# Patient Record
Sex: FEMALE | Race: WHITE | NOT HISPANIC OR LATINO | Employment: FULL TIME | URBAN - METROPOLITAN AREA
[De-identification: names, ages, dates, MRNs, and addresses within clinical notes are randomized per-mention and may not be internally consistent; named-entity substitution may affect disease eponyms.]

---

## 2023-09-11 LAB
ALANINE AMINOTRANSFERASE (SGPT) (U/L) IN SER/PLAS: 13 U/L (ref 7–45)
ALBUMIN (G/DL) IN SER/PLAS: 4.6 G/DL (ref 3.4–5)
ALKALINE PHOSPHATASE (U/L) IN SER/PLAS: 40 U/L (ref 33–110)
ANION GAP IN SER/PLAS: 11 MMOL/L (ref 10–20)
APPEARANCE, URINE: CLEAR
ASPARTATE AMINOTRANSFERASE (SGOT) (U/L) IN SER/PLAS: 12 U/L (ref 9–39)
BASOPHILS (10*3/UL) IN BLOOD BY AUTOMATED COUNT: 0.05 X10E9/L (ref 0–0.1)
BASOPHILS/100 LEUKOCYTES IN BLOOD BY AUTOMATED COUNT: 1 % (ref 0–2)
BILIRUBIN TOTAL (MG/DL) IN SER/PLAS: 0.9 MG/DL (ref 0–1.2)
BILIRUBIN, URINE: NEGATIVE
BLOOD, URINE: NEGATIVE
CALCIDIOL (25 OH VITAMIN D3) (NG/ML) IN SER/PLAS: 36 NG/ML
CALCIUM (MG/DL) IN SER/PLAS: 9.6 MG/DL (ref 8.6–10.6)
CARBON DIOXIDE, TOTAL (MMOL/L) IN SER/PLAS: 29 MMOL/L (ref 21–32)
CHLORIDE (MMOL/L) IN SER/PLAS: 107 MMOL/L (ref 98–107)
COLOR, URINE: YELLOW
COMPLEMENT C3 (MG/DL) IN SER/PLAS: 115 MG/DL (ref 87–200)
COMPLEMENT C4 (MG/DL) IN SER/PLAS: 25 MG/DL (ref 10–50)
CREATININE (MG/DL) IN SER/PLAS: 0.68 MG/DL (ref 0.5–1.05)
CREATININE (MG/DL) IN URINE: 62 MG/DL (ref 20–320)
EOSINOPHILS (10*3/UL) IN BLOOD BY AUTOMATED COUNT: 0.13 X10E9/L (ref 0–0.7)
EOSINOPHILS/100 LEUKOCYTES IN BLOOD BY AUTOMATED COUNT: 2.6 % (ref 0–6)
ERYTHROCYTE DISTRIBUTION WIDTH (RATIO) BY AUTOMATED COUNT: 12.2 % (ref 11.5–14.5)
ERYTHROCYTE MEAN CORPUSCULAR HEMOGLOBIN CONCENTRATION (G/DL) BY AUTOMATED: 33.7 G/DL (ref 32–36)
ERYTHROCYTE MEAN CORPUSCULAR VOLUME (FL) BY AUTOMATED COUNT: 95 FL (ref 80–100)
ERYTHROCYTES (10*6/UL) IN BLOOD BY AUTOMATED COUNT: 4.39 X10E12/L (ref 4–5.2)
GFR FEMALE: >90 ML/MIN/1.73M2
GLUCOSE (MG/DL) IN SER/PLAS: 77 MG/DL (ref 74–99)
GLUCOSE, URINE: NEGATIVE MG/DL
HEMATOCRIT (%) IN BLOOD BY AUTOMATED COUNT: 41.9 % (ref 36–46)
HEMOGLOBIN (G/DL) IN BLOOD: 14.1 G/DL (ref 12–16)
IMMATURE GRANULOCYTES/100 LEUKOCYTES IN BLOOD BY AUTOMATED COUNT: 0.4 % (ref 0–0.9)
KETONES, URINE: NEGATIVE MG/DL
LEUKOCYTE ESTERASE, URINE: NEGATIVE
LEUKOCYTES (10*3/UL) IN BLOOD BY AUTOMATED COUNT: 5 X10E9/L (ref 4.4–11.3)
LYMPHOCYTES (10*3/UL) IN BLOOD BY AUTOMATED COUNT: 2.11 X10E9/L (ref 1.2–4.8)
LYMPHOCYTES/100 LEUKOCYTES IN BLOOD BY AUTOMATED COUNT: 42.3 % (ref 13–44)
MONOCYTES (10*3/UL) IN BLOOD BY AUTOMATED COUNT: 0.25 X10E9/L (ref 0.1–1)
MONOCYTES/100 LEUKOCYTES IN BLOOD BY AUTOMATED COUNT: 5 % (ref 2–10)
NEUTROPHILS (10*3/UL) IN BLOOD BY AUTOMATED COUNT: 2.43 X10E9/L (ref 1.2–7.7)
NEUTROPHILS/100 LEUKOCYTES IN BLOOD BY AUTOMATED COUNT: 48.7 % (ref 40–80)
NITRITE, URINE: NEGATIVE
NRBC (PER 100 WBCS) BY AUTOMATED COUNT: 0 /100 WBC (ref 0–0)
PH, URINE: 6 (ref 5–8)
PLATELETS (10*3/UL) IN BLOOD AUTOMATED COUNT: 215 X10E9/L (ref 150–450)
POTASSIUM (MMOL/L) IN SER/PLAS: 4.6 MMOL/L (ref 3.5–5.3)
PROTEIN (MG/DL) IN URINE: 6 MG/DL (ref 5–24)
PROTEIN TOTAL: 7.5 G/DL (ref 6.4–8.2)
PROTEIN, URINE: NEGATIVE MG/DL
PROTEIN/CREATININE (MG/MG) IN URINE: 0.1 MG/MG CREAT (ref 0–0.17)
SEDIMENTATION RATE, ERYTHROCYTE: 1 MM/H (ref 0–20)
SODIUM (MMOL/L) IN SER/PLAS: 142 MMOL/L (ref 136–145)
SPECIFIC GRAVITY, URINE: 1.01 (ref 1–1.03)
UREA NITROGEN (MG/DL) IN SER/PLAS: 10 MG/DL (ref 6–23)
UROBILINOGEN, URINE: <2 MG/DL (ref 0–1.9)

## 2023-09-27 PROBLEM — E03.9 HYPOTHYROIDISM: Status: ACTIVE | Noted: 2023-09-27

## 2023-09-27 PROBLEM — R53.83 FATIGUE: Status: ACTIVE | Noted: 2023-09-27

## 2023-09-27 PROBLEM — Z97.5 IMPLANON IN PLACE: Status: ACTIVE | Noted: 2023-09-27

## 2023-09-27 PROBLEM — E06.3 HASHIMOTO'S THYROIDITIS: Status: ACTIVE | Noted: 2023-09-27

## 2023-09-27 PROBLEM — H04.129 DRY EYE SYNDROME: Status: ACTIVE | Noted: 2023-09-27

## 2023-09-27 PROBLEM — I87.1 MAY-THURNER SYNDROME: Status: ACTIVE | Noted: 2023-09-27

## 2023-09-27 PROBLEM — Z01.00 EMMETROPIA: Status: ACTIVE | Noted: 2023-09-27

## 2023-09-27 PROBLEM — R31.9 HEMATURIA: Status: ACTIVE | Noted: 2023-09-27

## 2023-09-27 PROBLEM — M06.4 INFLAMMATORY POLYARTHROPATHY (MULTI): Status: ACTIVE | Noted: 2023-09-27

## 2023-09-27 PROBLEM — Z86.39 H/O HASHIMOTO THYROIDITIS: Status: ACTIVE | Noted: 2023-09-27

## 2023-09-27 PROBLEM — D68.51 FACTOR V LEIDEN MUTATION (MULTI): Status: ACTIVE | Noted: 2023-09-27

## 2023-09-27 PROBLEM — R25.2 TRISMUS: Status: ACTIVE | Noted: 2023-09-27

## 2023-09-27 PROBLEM — Z79.60 LONG-TERM USE OF IMMUNOSUPPRESSANT MEDICATION: Status: ACTIVE | Noted: 2023-09-27

## 2023-09-27 PROBLEM — M26.69 TMJ LOCKING: Status: ACTIVE | Noted: 2023-09-27

## 2023-09-27 PROBLEM — I82.409 DVT OF LEG (DEEP VENOUS THROMBOSIS) (MULTI): Status: ACTIVE | Noted: 2023-09-27

## 2023-09-27 PROBLEM — R21 RASH: Status: ACTIVE | Noted: 2023-09-27

## 2023-09-27 PROBLEM — K21.9 GASTROESOPHAGEAL REFLUX DISEASE: Status: ACTIVE | Noted: 2023-09-27

## 2023-09-27 PROBLEM — L03.90 CELLULITIS: Status: ACTIVE | Noted: 2023-09-27

## 2023-09-27 PROBLEM — Z79.899 LONG-TERM USE OF HYDROXYCHLOROQUINE: Status: ACTIVE | Noted: 2023-09-27

## 2023-09-27 PROBLEM — M32.9 SYSTEMIC LUPUS ERYTHEMATOSUS (MULTI): Status: ACTIVE | Noted: 2023-09-27

## 2023-09-27 PROBLEM — E55.9 VITAMIN D DEFICIENCY: Status: ACTIVE | Noted: 2023-09-27

## 2023-09-27 PROBLEM — L08.9 PUSTULE: Status: ACTIVE | Noted: 2023-09-27

## 2023-09-27 PROBLEM — F43.23 ADJUSTMENT REACTION WITH ANXIETY AND DEPRESSION: Status: ACTIVE | Noted: 2023-09-27

## 2023-09-27 RX ORDER — HYDROXYZINE HYDROCHLORIDE 25 MG/1
1 TABLET, FILM COATED ORAL 3 TIMES DAILY PRN
COMMUNITY
Start: 2017-11-16 | End: 2023-10-02 | Stop reason: SDUPTHER

## 2023-09-27 RX ORDER — HYDROXYCHLOROQUINE SULFATE 200 MG/1
1 TABLET, FILM COATED ORAL DAILY
COMMUNITY
Start: 2019-01-30 | End: 2024-02-19 | Stop reason: SDUPTHER

## 2023-09-27 RX ORDER — NAPROXEN 500 MG/1
500 TABLET ORAL 2 TIMES DAILY
COMMUNITY
Start: 2020-03-28 | End: 2023-10-02 | Stop reason: ALTCHOICE

## 2023-09-27 RX ORDER — PANTOPRAZOLE SODIUM 40 MG/1
40 TABLET, DELAYED RELEASE ORAL
COMMUNITY
Start: 2019-01-03

## 2023-09-27 RX ORDER — ERGOCALCIFEROL 1.25 MG/1
1 CAPSULE ORAL
COMMUNITY
Start: 2019-01-17

## 2023-09-27 RX ORDER — BELIMUMAB 200 MG/ML
200 SOLUTION SUBCUTANEOUS
COMMUNITY
Start: 2020-09-04 | End: 2023-10-02 | Stop reason: ALTCHOICE

## 2023-09-27 RX ORDER — ETONOGESTREL 68 MG/1
1 IMPLANT SUBCUTANEOUS ONCE
COMMUNITY
End: 2023-11-16 | Stop reason: ALTCHOICE

## 2023-09-27 RX ORDER — ERYTHROMYCIN AND BENZOYL PEROXIDE 30; 50 MG/G; MG/G
1 GEL TOPICAL
COMMUNITY
Start: 2015-02-13 | End: 2023-10-02 | Stop reason: ALTCHOICE

## 2023-10-02 ENCOUNTER — OFFICE VISIT (OUTPATIENT)
Dept: PRIMARY CARE | Facility: CLINIC | Age: 28
End: 2023-10-02
Payer: COMMERCIAL

## 2023-10-02 VITALS
HEIGHT: 62 IN | OXYGEN SATURATION: 98 % | BODY MASS INDEX: 22.93 KG/M2 | SYSTOLIC BLOOD PRESSURE: 114 MMHG | TEMPERATURE: 98.1 F | WEIGHT: 124.6 LBS | HEART RATE: 76 BPM | DIASTOLIC BLOOD PRESSURE: 79 MMHG

## 2023-10-02 DIAGNOSIS — M06.4 INFLAMMATORY POLYARTHROPATHY (MULTI): ICD-10-CM

## 2023-10-02 DIAGNOSIS — D68.51 FACTOR V LEIDEN MUTATION (MULTI): ICD-10-CM

## 2023-10-02 DIAGNOSIS — M32.9 SYSTEMIC LUPUS ERYTHEMATOSUS, UNSPECIFIED SLE TYPE, UNSPECIFIED ORGAN INVOLVEMENT STATUS (MULTI): Chronic | ICD-10-CM

## 2023-10-02 DIAGNOSIS — F43.23 ADJUSTMENT REACTION WITH ANXIETY AND DEPRESSION: ICD-10-CM

## 2023-10-02 DIAGNOSIS — I87.1 MAY-THURNER SYNDROME: Primary | Chronic | ICD-10-CM

## 2023-10-02 PROCEDURE — 1036F TOBACCO NON-USER: CPT | Performed by: NURSE PRACTITIONER

## 2023-10-02 PROCEDURE — 99214 OFFICE O/P EST MOD 30 MIN: CPT | Performed by: NURSE PRACTITIONER

## 2023-10-02 RX ORDER — HYDROXYZINE HYDROCHLORIDE 25 MG/1
25 TABLET, FILM COATED ORAL 3 TIMES DAILY PRN
Qty: 30 TABLET | Refills: 1 | Status: SHIPPED | OUTPATIENT
Start: 2023-10-02 | End: 2024-05-20 | Stop reason: SDUPTHER

## 2023-10-02 ASSESSMENT — PATIENT HEALTH QUESTIONNAIRE - PHQ9
SUM OF ALL RESPONSES TO PHQ9 QUESTIONS 1 AND 2: 0
1. LITTLE INTEREST OR PLEASURE IN DOING THINGS: NOT AT ALL
2. FEELING DOWN, DEPRESSED OR HOPELESS: NOT AT ALL

## 2023-10-02 NOTE — ASSESSMENT & PLAN NOTE
Per rheumatology  Not taking her plaquenil now as she states she has been stable off meds for 1-2 years. Will discuss with specialist.

## 2023-10-02 NOTE — PROGRESS NOTES
"Subjective   Patient ID: Maryan Mao is a 28 y.o. female who presents for Annual Exam.    Patient of Dr. Fox.    Lost her insurance and couldn't get in to everyone.   She has a history of blood clots and favor V and May-Thurner syndrome. She has been maintained on Xarleto 10 mg in the past but was not able to afford in the past 1-2 years due to lack of insurance. Now that she is covered again she is trying to res establish her care with specialists.     Saw her GYN last month for PAP.  See rheumatology for lupus. Is on plaquenil but is not currently taking as she has felt well with no flares since being off for the past 1-2 years. She would like to avoid if possible. Will discuss with rheum as labs were ok.            Review of Systems  otherwise negative aside from what was mentioned above in HPI.    Objective   /79 (BP Location: Left arm, Patient Position: Sitting, BP Cuff Size: Adult)   Pulse 76   Temp 36.7 °C (98.1 °F) (Temporal)   Ht 1.562 m (5' 1.5\")   Wt 56.5 kg (124 lb 9.6 oz)   SpO2 98%   BMI 23.16 kg/m²     Physical Exam  Vitals reviewed.   Constitutional:       Appearance: Normal appearance.   HENT:      Head: Normocephalic.      Right Ear: Tympanic membrane, ear canal and external ear normal.      Left Ear: Tympanic membrane, ear canal and external ear normal.      Nose: Nose normal.      Mouth/Throat:      Mouth: Mucous membranes are moist.   Eyes:      Conjunctiva/sclera: Conjunctivae normal.      Pupils: Pupils are equal, round, and reactive to light.   Cardiovascular:      Rate and Rhythm: Normal rate and regular rhythm.      Pulses: Normal pulses.      Heart sounds: Normal heart sounds.   Pulmonary:      Effort: Pulmonary effort is normal.      Breath sounds: Normal breath sounds.   Abdominal:      General: Abdomen is flat. Bowel sounds are normal.      Palpations: Abdomen is soft.   Musculoskeletal:         General: Normal range of motion.      Cervical back: Normal range of " motion and neck supple.   Skin:     General: Skin is warm.   Neurological:      Mental Status: She is alert.   Psychiatric:         Mood and Affect: Mood normal.         Behavior: Behavior normal.         Thought Content: Thought content normal.         Judgment: Judgment normal.         Assessment/Plan   Problem List Items Addressed This Visit             ICD-10-CM       Cardiac and Vasculature    May-Thurner syndrome - Primary (Chronic) I87.1    Relevant Medications    rivaroxaban (Xarelto) 10 mg tablet    Other Relevant Orders    Referral to Hematology and Oncology       Coag and Thromboembolic    Factor V Leiden mutation (CMS/HCC) (Chronic) D68.51     Refer to hematology  Restart the xarelto.         Relevant Medications    rivaroxaban (Xarelto) 10 mg tablet    Other Relevant Orders    Referral to Hematology and Oncology    Follow Up In [facility] - PCP - Health Maintenance       Mental Health    Adjustment reaction with anxiety and depression F43.23    Relevant Medications    hydrOXYzine HCL (Atarax) 25 mg tablet       Multi-system (Lupus, Sarcoid)    Systemic lupus erythematosus (CMS/HCC) (Chronic) M32.9     Per rheumatology  Not taking her plaquenil now as she states she has been stable off meds for 1-2 years. Will discuss with specialist.         Relevant Orders    Follow Up In [facility] - PCP - Health Maintenance    Inflammatory polyarthropathy (CMS/HCC) M06.4    Follow up CPE in 3 months     Health Maintenance  Women:  Self breast exams monthly  Clinical breast examinations with PAP testing; done with GYN 9/5/2023  Get fasting labs done    Immunizations and routine follow up:  TDAP: 2019 UTD    Encourage diet and exercise to maintain healthy lifestyle.

## 2023-11-09 ENCOUNTER — APPOINTMENT (OUTPATIENT)
Dept: OBSTETRICS AND GYNECOLOGY | Facility: CLINIC | Age: 28
End: 2023-11-09
Payer: COMMERCIAL

## 2023-11-16 ENCOUNTER — PROCEDURE VISIT (OUTPATIENT)
Dept: OBSTETRICS AND GYNECOLOGY | Facility: CLINIC | Age: 28
End: 2023-11-16
Payer: COMMERCIAL

## 2023-11-16 VITALS — BODY MASS INDEX: 24.74 KG/M2 | SYSTOLIC BLOOD PRESSURE: 120 MMHG | DIASTOLIC BLOOD PRESSURE: 72 MMHG | WEIGHT: 133.1 LBS

## 2023-11-16 DIAGNOSIS — Z30.46 ENCOUNTER FOR NEXPLANON REMOVAL: ICD-10-CM

## 2023-11-16 DIAGNOSIS — R30.9 PAINFUL URINATION: ICD-10-CM

## 2023-11-16 PROCEDURE — 87186 SC STD MICRODIL/AGAR DIL: CPT

## 2023-11-16 PROCEDURE — 87086 URINE CULTURE/COLONY COUNT: CPT

## 2023-11-16 PROCEDURE — 11982 REMOVE DRUG IMPLANT DEVICE: CPT | Performed by: ADVANCED PRACTICE MIDWIFE

## 2023-11-16 PROCEDURE — 99212 OFFICE O/P EST SF 10 MIN: CPT | Performed by: ADVANCED PRACTICE MIDWIFE

## 2023-11-16 RX ORDER — NITROFURANTOIN 25; 75 MG/1; MG/1
100 CAPSULE ORAL 2 TIMES DAILY
Qty: 14 CAPSULE | Refills: 0 | Status: SHIPPED | OUTPATIENT
Start: 2023-11-16 | End: 2023-11-23

## 2023-11-16 RX ORDER — LIDOCAINE HYDROCHLORIDE 10 MG/ML
3 INJECTION INFILTRATION; PERINEURAL ONCE
Status: COMPLETED | OUTPATIENT
Start: 2023-11-16 | End: 2023-11-16

## 2023-11-16 RX ADMIN — LIDOCAINE HYDROCHLORIDE 3 ML: 10 INJECTION INFILTRATION; PERINEURAL at 10:07

## 2023-11-16 NOTE — PROGRESS NOTES
NEXPLANON REMOVAL INSERTION NOTE    Patient does not want anything right now for birth control. Patient complaining of burning with urination and frequency. Has had the same symptoms in the past and she had +UTIs then.   Plan: 1. Urine cx sent. 2. Macrobid 100mg BID x 7days sent. 3. Nexplanon out today.     Patient's pre-procedure questions were answered and a written informed consent form was signed.   The proximal and distal ends of the Nexplanon device were identified in the patient's upper extremity, they were then marked with a pen.  The site was cleansed with ChloraPrep.  The site was anesthetized with 1 mL of 1% lidocaine with epinephrine was inserted just below the distal tip, with the aid of compression of the proximal tip to elevate the distal tip of the Nexplanon device.  Once this was done the planned incision site was checked and was adequately anesthetized.  A 3 mm incision was made over the distal tip of the Nexplanon wiley.  After this was done the proximal end of the Nexplanon wiley was compressed to bring the distal tip to the opening.  Any present scar tissue superior to the tip of the Nexplanon wiley tip was lysed sharply with a scalpel.  The device was then extruded through the skin incision.  The Nexplanon wiley was then able to grasped with a hemostat and it was removed intact without difficulty and was intact.  Upon removal the Nexplanon device was inspected and it was fully intact.  The site was dressed with a Band-Aid and a sterile gauze to prevent bruising.  There were no complications.  Blood loss was minimal.    LINDA Madera

## 2023-11-18 LAB — BACTERIA UR CULT: ABNORMAL

## 2023-11-20 ENCOUNTER — TELEPHONE (OUTPATIENT)
Dept: OBSTETRICS AND GYNECOLOGY | Facility: CLINIC | Age: 28
End: 2023-11-20
Payer: COMMERCIAL

## 2023-11-20 NOTE — TELEPHONE ENCOUNTER
----- Message from Karen Valdez MA sent at 11/20/2023  8:55 AM EST -----  lvm  ----- Message -----  From: LINDA Madera  Sent: 11/20/2023   8:53 AM EST  To: Karen Valdez MA    Her urine culture was + for a +UTI.  the Rx that was given at her visit was macrobid and that will treat her UTI      thanks    ----- Message -----  From: Lab, Background User  Sent: 11/17/2023   5:58 PM EST  To: LINDA Madera

## 2023-11-25 NOTE — PROGRESS NOTES
Patient ID: Maryan Mao is a 28 y.o. female.  Referring Physician: Nava Crisostomo, APRN-CNP  [address]  [facility], [address]  [place] [id number]  Primary Care Provider: Malathi Fox MD      Subjective    HPI  Ms. Maryan Mao is a 29 y/o F presenting for initial consultation at the request of Nava Crisostomo CNP, for Factor V Leiden and May-Thurner syndrome.     Patient was intially diagnosed with a clot in her internal iliac vein 11 years ago at age 17. At that time, she was followed by Pediatric Hematology and found to be heterozygous for Factor V Leiden. She was placed on Xarelto 10 mg day. She lost her insurance 2 years ago and has been off of Xarelto. During that initial diagnosis she was found to have lupus which is currently well controlled with Plaquenil. While she was off blood thinners for 2 years she did not develop any new issues with clotting. Otherwise, no other significant symptoms today. No new abdominal pain. No new lower extremity swelling. Patient at initial diagnosis was on a blood thinner for 6 months and has not been on any since. She did have a Nexplanon which was removed just recently. Not currently on any blood thinners. She did try to restart Xarelto, however there was a cost of over $500 which she could not afford. No specific complaints today. She reports that she is very active.    Patient's past medical history, surgical history, family history and social history reviewed.     Objective   Vitals:    11/29/23 1620   BP: 105/68   Pulse: 84   Resp: 16   Temp: 36.3 °C (97.3 °F)   SpO2: 98%      Review of Systems:   Review of Systems:    Positive per HPI, otherwise negative.     Physical Exam:   Constitutional: Patient appears in no acute distress.   Sitting comfortably in chair.  Eyes: EOMI, clear sclera  ENMT: mucous membranes moist, no apparent injury  Head/Neck: Neck supple, no JVD  Respiratory/Thorax: Patent airways, CTAB, normal  breath sounds, no  increased work of breathing  Cardiovascular: Regular, rate and rhythm, no murmurs  Gastrointestinal: Nondistended, soft, non-tender,  no rebound tenderness or guarding, no masses palpable  Extremities: normal extremities, no cyanosis edema,  no swelling  Neurological: alert and oriented x3, nonfocal, normal  speech and hearing  Lymphatic: No palpable lymphadenopathy in cervical,  axillary  lymph nodes.  Spleen appears normal size.  Psychological: Appropriate mood and behavior, normal  affect  Skin: Warm and dry, no lesions, no rashes     Performance Status:  Symptomatic; fully ambulatory    Labs/Imaging/Pathology: personally reviewed reports and images in Epic electronic medical record system. Pertinent results as it related to the plan represented in below in assessment and plan.      Assessment/Plan    1. History of Factor V Leiden  2. History of May-Thurner     - Intially diagnosed at age 17 in the setting of being on a blood thinner and presenting with new clot in her internal iliac. Since that time, she was on Xarelto up until 2021 when she lost her insurance. Has been off of all blood thinners with no evidence of recurrence.  - At this point, anticoagulation with a direct oral anticoagulant is cost prohibitive for her.  - We discussed risk versus benefit and we could reassess her May-Thurner with Vascular Surgery to see if she would benefit from a stent placement. Referral has been placed and she is agreeable.  - We discussed being heterozygous for Factor V Leiden the risk of clotting is not significantly higher than the general population if there are not other risk factors.  - At this point, given she is young and active she prefers to stay off blood thinners which I think is reasonable.  - We discussed the importance of staying active and high-risk situations where anticoagulation can be used such as perioperatively or during prolonged travel.  - At this point, no further workup.  - She will follow-up with  Vascular Surgery.  - We will hold off on blood thinners.  - RTC as needed.     RTC as needed. This note has been transcribed using a medical scribe and there is a possibility of unintentional typing misprints.         Dottie Terrell MD  Hematology/Oncology  [facility]    Scribe Attestation  By signing my name below, I Bibidavid Pacheco, Scribe attest that this documentation has been prepared under the direction and in the presence of Dottie Terrell MD.

## 2023-11-29 ENCOUNTER — OFFICE VISIT (OUTPATIENT)
Dept: HEMATOLOGY/ONCOLOGY | Facility: CLINIC | Age: 28
End: 2023-11-29
Payer: COMMERCIAL

## 2023-11-29 VITALS
HEIGHT: 61 IN | BODY MASS INDEX: 25.14 KG/M2 | TEMPERATURE: 97.3 F | OXYGEN SATURATION: 98 % | SYSTOLIC BLOOD PRESSURE: 105 MMHG | DIASTOLIC BLOOD PRESSURE: 68 MMHG | HEART RATE: 84 BPM | WEIGHT: 133.16 LBS | RESPIRATION RATE: 16 BRPM

## 2023-11-29 DIAGNOSIS — I87.1 MAY-THURNER SYNDROME: Chronic | ICD-10-CM

## 2023-11-29 DIAGNOSIS — D68.51 FACTOR V LEIDEN MUTATION (MULTI): ICD-10-CM

## 2023-11-29 PROCEDURE — 99213 OFFICE O/P EST LOW 20 MIN: CPT | Performed by: INTERNAL MEDICINE

## 2023-11-29 PROCEDURE — 1036F TOBACCO NON-USER: CPT | Performed by: INTERNAL MEDICINE

## 2023-11-29 PROCEDURE — 99203 OFFICE O/P NEW LOW 30 MIN: CPT | Performed by: INTERNAL MEDICINE

## 2023-11-29 ASSESSMENT — PAIN SCALES - GENERAL: PAINLEVEL: 0-NO PAIN

## 2023-12-07 ENCOUNTER — OFFICE VISIT (OUTPATIENT)
Dept: URGENT CARE | Facility: CLINIC | Age: 28
End: 2023-12-07
Payer: COMMERCIAL

## 2023-12-07 VITALS
SYSTOLIC BLOOD PRESSURE: 105 MMHG | BODY MASS INDEX: 24.73 KG/M2 | WEIGHT: 131 LBS | HEART RATE: 86 BPM | OXYGEN SATURATION: 98 % | TEMPERATURE: 97.9 F | DIASTOLIC BLOOD PRESSURE: 60 MMHG | HEIGHT: 61 IN | RESPIRATION RATE: 16 BRPM

## 2023-12-07 DIAGNOSIS — J06.9 UPPER RESPIRATORY TRACT INFECTION, UNSPECIFIED TYPE: Primary | ICD-10-CM

## 2023-12-07 DIAGNOSIS — R05.1 ACUTE COUGH: ICD-10-CM

## 2023-12-07 LAB — POC SARS-COV-2 AG: NORMAL

## 2023-12-07 PROCEDURE — 87426 SARSCOV CORONAVIRUS AG IA: CPT | Performed by: PHYSICIAN ASSISTANT

## 2023-12-07 PROCEDURE — 1036F TOBACCO NON-USER: CPT | Performed by: PHYSICIAN ASSISTANT

## 2023-12-07 PROCEDURE — 99204 OFFICE O/P NEW MOD 45 MIN: CPT | Performed by: PHYSICIAN ASSISTANT

## 2023-12-07 RX ORDER — PROMETHAZINE HYDROCHLORIDE AND DEXTROMETHORPHAN HYDROBROMIDE 6.25; 15 MG/5ML; MG/5ML
5 SYRUP ORAL 4 TIMES DAILY PRN
Qty: 118 ML | Refills: 0 | Status: SHIPPED | OUTPATIENT
Start: 2023-12-07 | End: 2023-12-14

## 2023-12-07 ASSESSMENT — ENCOUNTER SYMPTOMS
MUSCULOSKELETAL NEGATIVE: 1
HEMATOLOGIC/LYMPHATIC NEGATIVE: 1
ENDOCRINE NEGATIVE: 1
FEVER: 0
CARDIOVASCULAR NEGATIVE: 1
NEUROLOGICAL NEGATIVE: 1
SORE THROAT: 0
COUGH: 1
PSYCHIATRIC NEGATIVE: 1
GASTROINTESTINAL NEGATIVE: 1
EYES NEGATIVE: 1
ALLERGIC/IMMUNOLOGIC NEGATIVE: 1

## 2023-12-07 ASSESSMENT — PAIN SCALES - GENERAL: PAINLEVEL: 4

## 2023-12-07 NOTE — PATIENT INSTRUCTIONS
Increase clear fluids  Motrin or tylenol as directed for pain or fever  Pcp follow up this week if not improving or worsening  ER visit anytime 24/7 for acute worsening or changing condition

## 2023-12-07 NOTE — PROGRESS NOTES
Subjective   Patient ID: Maryan Mao is a 28 y.o. female.      History provided by:  Patient   used: No    Cough  Associated symptoms include ear pain. Pertinent negatives include no fever or sore throat.   This is a 28 yr old female her for neck pain, cough productive of yellow sputum, left ear pain and clear/yellow nasal congestion x 4 days. No sore throat or fever.     Review of Systems   Constitutional:  Negative for fever.   HENT:  Positive for congestion and ear pain. Negative for sore throat.    Eyes: Negative.    Respiratory:  Positive for cough.    Cardiovascular: Negative.    Gastrointestinal: Negative.    Endocrine: Negative.    Genitourinary: Negative.    Musculoskeletal: Negative.    Skin: Negative.    Allergic/Immunologic: Negative.    Neurological: Negative.    Hematological: Negative.    Psychiatric/Behavioral: Negative.     All other systems reviewed and are negative.    Past Medical History:   Diagnosis Date    Acute pharyngitis, unspecified 09/26/2014    Sore throat    Anxiety disorder, unspecified     Anxiety    Dysuria 04/23/2014    Dysuria    Encounter for gynecological examination (general) (routine) without abnormal findings 12/16/2015    Visit for routine gyn exam    Encounter for screening for infections with a predominantly sexual mode of transmission 12/16/2015    Routine screening for STI (sexually transmitted infection)    GERD (gastroesophageal reflux disease)     Long term (current) use of systemic steroids 05/21/2015    On prednisone therapy    Long term (current) use of systemic steroids 02/09/2016    Long term current use of systemic steroids    Other conditions influencing health status 09/23/2015    Long-term current use of steroids    Other conditions influencing health status 02/09/2016    Paronychia of fourth finger of right hand    Other conditions influencing health status 08/06/2013    Paronychia of second finger of left hand    Pain in  "unspecified knee 10/27/2014    Joint pain, knee    Personal history of diseases of the skin and subcutaneous tissue 08/06/2013    History of abscess of skin and subcutaneous tissue    Personal history of other diseases of the respiratory system 12/02/2013    History of acute pharyngitis    Personal history of other endocrine, nutritional and metabolic disease     History of hypothyroidism    Personal history of other specified conditions 07/12/2016    History of headache     Current Outpatient Medications on File Prior to Visit   Medication Sig Dispense Refill    ergocalciferol (Vitamin D-2) 1.25 MG (63431 UT) capsule Take 1 capsule (1,250 mcg) by mouth 1 (one) time per week.      hydroxychloroquine (Plaquenil) 200 mg tablet Take 1 tablet (200 mg) by mouth once daily.      hydrOXYzine HCL (Atarax) 25 mg tablet Take 1 tablet (25 mg) by mouth 3 times a day as needed for anxiety. 30 tablet 1    pantoprazole (ProtoNix) 40 mg EC tablet Take 1 tablet (40 mg) by mouth 2 times a day before meals.      rivaroxaban (Xarelto) 10 mg tablet Take 1 tablet (10 mg) by mouth once daily. 90 tablet 1     No current facility-administered medications on file prior to visit.     Allergies   Allergen Reactions    Adhesive Tape-Silicones Rash and Swelling     clear tape    Latex Rash    Shellfish Containing Products Rash     Shellfish     /60   Pulse 86   Temp 36.6 °C (97.9 °F) (Temporal)   Resp 16   Ht 1.537 m (5' 0.5\")   Wt 59.4 kg (131 lb)   SpO2 98%   BMI 25.16 kg/m²   Objective   Physical Exam  Vitals and nursing note reviewed.   Constitutional:       Appearance: Normal appearance.   HENT:      Head: Normocephalic and atraumatic.      Right Ear: Tympanic membrane and ear canal normal.      Left Ear: Tympanic membrane and ear canal normal.      Mouth/Throat:      Mouth: Mucous membranes are moist.      Pharynx: Oropharynx is clear.   Cardiovascular:      Rate and Rhythm: Normal rate and regular rhythm.   Pulmonary:      " Effort: Pulmonary effort is normal.      Breath sounds: Normal breath sounds.   Musculoskeletal:      Cervical back: Neck supple.   Lymphadenopathy:      Cervical: No cervical adenopathy.   Skin:     General: Skin is warm and dry.   Neurological:      General: No focal deficit present.      Mental Status: She is alert and oriented to person, place, and time.   Psychiatric:         Mood and Affect: Mood normal.         Behavior: Behavior normal.     Rapid COVID-negative    Assessment:  URI  Cough    Plan:  Phenergan DM syrup as directed  Fluids and rest  Antipyretics as directed for fever or pain  Pcp follow up this week if not improving or worsening  ER visit anytime 24/7 for acute worsening or changing condition

## 2024-01-02 ENCOUNTER — OFFICE VISIT (OUTPATIENT)
Dept: PRIMARY CARE | Facility: CLINIC | Age: 29
End: 2024-01-02
Payer: COMMERCIAL

## 2024-01-02 VITALS
HEART RATE: 62 BPM | OXYGEN SATURATION: 99 % | RESPIRATION RATE: 16 BRPM | HEIGHT: 62 IN | TEMPERATURE: 97.5 F | BODY MASS INDEX: 23.77 KG/M2 | DIASTOLIC BLOOD PRESSURE: 72 MMHG | SYSTOLIC BLOOD PRESSURE: 112 MMHG | WEIGHT: 129.2 LBS

## 2024-01-02 DIAGNOSIS — M32.9 SYSTEMIC LUPUS ERYTHEMATOSUS, UNSPECIFIED SLE TYPE, UNSPECIFIED ORGAN INVOLVEMENT STATUS (MULTI): Chronic | ICD-10-CM

## 2024-01-02 DIAGNOSIS — Z00.00 HEALTHCARE MAINTENANCE: Primary | ICD-10-CM

## 2024-01-02 DIAGNOSIS — D68.51 FACTOR V LEIDEN MUTATION (MULTI): ICD-10-CM

## 2024-01-02 PROBLEM — Z97.5 IMPLANON IN PLACE: Status: RESOLVED | Noted: 2023-09-27 | Resolved: 2024-01-02

## 2024-01-02 PROBLEM — R31.9 HEMATURIA: Status: RESOLVED | Noted: 2023-09-27 | Resolved: 2024-01-02

## 2024-01-02 PROBLEM — L03.90 CELLULITIS: Status: RESOLVED | Noted: 2023-09-27 | Resolved: 2024-01-02

## 2024-01-02 PROBLEM — R21 RASH: Status: RESOLVED | Noted: 2023-09-27 | Resolved: 2024-01-02

## 2024-01-02 PROCEDURE — 99395 PREV VISIT EST AGE 18-39: CPT | Performed by: INTERNAL MEDICINE

## 2024-01-02 PROCEDURE — 1036F TOBACCO NON-USER: CPT | Performed by: INTERNAL MEDICINE

## 2024-01-02 ASSESSMENT — ENCOUNTER SYMPTOMS
DIZZINESS: 0
UNEXPECTED WEIGHT CHANGE: 0
HEADACHES: 0
CONSTIPATION: 0
SORE THROAT: 0
HEMATURIA: 0
CHILLS: 0
FEVER: 0
VOMITING: 0
EYE PAIN: 0
RHINORRHEA: 0
BLOOD IN STOOL: 0
DIARRHEA: 0
DYSURIA: 0
NERVOUS/ANXIOUS: 0
MYALGIAS: 0
NAUSEA: 0
ARTHRALGIAS: 0
WOUND: 0
COUGH: 0
POLYPHAGIA: 0
DYSPHORIC MOOD: 0
SHORTNESS OF BREATH: 0
WHEEZING: 0
PALPITATIONS: 0
FREQUENCY: 0
CHEST TIGHTNESS: 0
ABDOMINAL PAIN: 0
POLYDIPSIA: 0

## 2024-01-02 NOTE — PROGRESS NOTES
"Subjective   Patient ID: Maryan Mao is a 28 y.o. female who presents for Annual Exam and Contraception (Had nexplanon removed on 11.16.23, body feels off now).    HPI     Here for annual  Lost insurance and did not have meds x 2 years  Has been in remission  Saw Dr. Terrell and told she can just do xarelto as needed for high risk situations    States rheumatology just resumed her meds and wants to get a 2nd opinion about risk of not taking her meds. She has been off meds x 2 years as lost insurance. She was never on plaquenil    Had nexplanon out. Did not have periods on this. When out, she states has had some periods but dark/brown discharge. Does not want anything. Using condoms    She feels well physically    Review of Systems   Constitutional:  Negative for chills, fever and unexpected weight change.   HENT:  Negative for congestion, hearing loss, rhinorrhea and sore throat.    Eyes:  Negative for pain and visual disturbance.   Respiratory:  Negative for cough, chest tightness, shortness of breath and wheezing.    Cardiovascular:  Negative for chest pain and palpitations.   Gastrointestinal:  Negative for abdominal pain, blood in stool, constipation, diarrhea, nausea and vomiting.   Endocrine: Negative for cold intolerance, heat intolerance, polydipsia and polyphagia.   Genitourinary:  Negative for dysuria, frequency and hematuria.   Musculoskeletal:  Negative for arthralgias and myalgias.   Skin:  Negative for rash and wound.   Neurological:  Negative for dizziness, syncope and headaches.   Psychiatric/Behavioral:  Negative for dysphoric mood. The patient is not nervous/anxious.        Objective   /72   Temp 36.4 °C (97.5 °F)   Resp 16   Ht 1.562 m (5' 1.5\")   Wt 58.6 kg (129 lb 3.2 oz)   BMI 24.02 kg/m²     Physical Exam  Vitals reviewed.   Constitutional:       Appearance: Normal appearance. She is not ill-appearing.   HENT:      Head: Normocephalic and atraumatic.      Right Ear: Tympanic " membrane normal.      Left Ear: Tympanic membrane normal.      Nose: Nose normal.      Mouth/Throat:      Mouth: Mucous membranes are moist.      Pharynx: Oropharynx is clear.   Eyes:      Extraocular Movements: Extraocular movements intact.      Conjunctiva/sclera: Conjunctivae normal.      Pupils: Pupils are equal, round, and reactive to light.   Cardiovascular:      Rate and Rhythm: Normal rate and regular rhythm.   Pulmonary:      Effort: Pulmonary effort is normal.      Breath sounds: Normal breath sounds. No wheezing.   Abdominal:      General: There is no distension.      Palpations: Abdomen is soft. There is no mass.      Tenderness: There is no abdominal tenderness.   Musculoskeletal:         General: No swelling.      Cervical back: Neck supple.   Lymphadenopathy:      Cervical: No cervical adenopathy.   Neurological:      General: No focal deficit present.      Mental Status: She is alert and oriented to person, place, and time.      Gait: Gait normal.   Psychiatric:         Mood and Affect: Mood normal.         Behavior: Behavior normal.         Thought Content: Thought content normal.         Assessment/Plan   Problem List Items Addressed This Visit             ICD-10-CM    Systemic lupus erythematosus (CMS/HCC) - Primary (Chronic) M32.9    Relevant Orders    Referral to Rheumatology     CPE completed.  Advised to keep a heart healthy, low fat  diet with fruits and veggies like Mediterranean diet.  Advised on the importance of exercise and maintaining 150 minutes of exercise per week (30 minutes per day 5 days a week).  Advised on regular eye and dental visits.  Discussed age appropriate cancer screening, immunizations and recommendations given.  Discussed avoiding illicit drugs and tobacco. Advised on appropriate use of alcohol.  Advised to wear seat belt.     SLE: diagnosed 2014 (10 years ago), complicated by DVT s/p angioplasty, issues with arthralgias, leukopenia, proteinuria, has lost hair, eyes  affected, rash, fevers, GI issues, blisters.   -seeing rheum  --on Plaquenil, mobic and Benlysta-OFF ALL MEDS X 2 YEARS. Send to Dr. Matthews for recs as wants a 2nd opinion about risk of not taking meds as meds make her feel terrible  --Positive anti ds dna    Discussed can take time for cycles to normalize. Discussed could do paragard if wishes to do something for periods     DVT:  -factor V Leiden heterozygous and hx of May Thurner  -off xarelto due to cost and Dr. Terrell said can do prn  -to see vascular for possible stent      Anxiety: Vistaril as needed     Hypothyroidism: back to normal off meds     Juvenile Rheumatoid arthritis: has been on mtx     GERD: On PPI     Vitamin d deficiency: WNL    CPE 1 year     f/u annually. Pneumovax UTD. Advised flu shot.  UTD tdap  UTD pap with gyn 9/23--repeat 5 years

## 2024-02-19 ENCOUNTER — OFFICE VISIT (OUTPATIENT)
Dept: RHEUMATOLOGY | Facility: CLINIC | Age: 29
End: 2024-02-19
Payer: COMMERCIAL

## 2024-02-19 ENCOUNTER — LAB (OUTPATIENT)
Dept: LAB | Facility: LAB | Age: 29
End: 2024-02-19
Payer: COMMERCIAL

## 2024-02-19 VITALS
DIASTOLIC BLOOD PRESSURE: 70 MMHG | OXYGEN SATURATION: 99 % | TEMPERATURE: 97.8 F | BODY MASS INDEX: 23.7 KG/M2 | HEART RATE: 81 BPM | WEIGHT: 128.8 LBS | SYSTOLIC BLOOD PRESSURE: 112 MMHG | HEIGHT: 62 IN

## 2024-02-19 DIAGNOSIS — M32.9 SYSTEMIC LUPUS ERYTHEMATOSUS, UNSPECIFIED SLE TYPE, UNSPECIFIED ORGAN INVOLVEMENT STATUS (MULTI): Chronic | ICD-10-CM

## 2024-02-19 DIAGNOSIS — Z79.899 LONG-TERM USE OF HYDROXYCHLOROQUINE: Primary | ICD-10-CM

## 2024-02-19 LAB
ALBUMIN SERPL BCP-MCNC: 4.8 G/DL (ref 3.4–5)
ALP SERPL-CCNC: 38 U/L (ref 33–110)
ALT SERPL W P-5'-P-CCNC: 12 U/L (ref 7–45)
ANION GAP SERPL CALC-SCNC: 14 MMOL/L (ref 10–20)
AST SERPL W P-5'-P-CCNC: 14 U/L (ref 9–39)
BASOPHILS # BLD AUTO: 0.05 X10*3/UL (ref 0–0.1)
BASOPHILS NFR BLD AUTO: 0.8 %
BILIRUB SERPL-MCNC: 1 MG/DL (ref 0–1.2)
BUN SERPL-MCNC: 12 MG/DL (ref 6–23)
CALCIUM SERPL-MCNC: 9.9 MG/DL (ref 8.6–10.3)
CHLORIDE SERPL-SCNC: 102 MMOL/L (ref 98–107)
CO2 SERPL-SCNC: 28 MMOL/L (ref 21–32)
CREAT SERPL-MCNC: 0.7 MG/DL (ref 0.5–1.05)
CREAT UR-MCNC: 77.3 MG/DL (ref 20–320)
EGFRCR SERPLBLD CKD-EPI 2021: >90 ML/MIN/1.73M*2
EOSINOPHIL # BLD AUTO: 0.09 X10*3/UL (ref 0–0.7)
EOSINOPHIL NFR BLD AUTO: 1.4 %
ERYTHROCYTE [DISTWIDTH] IN BLOOD BY AUTOMATED COUNT: 11.9 % (ref 11.5–14.5)
ERYTHROCYTE [SEDIMENTATION RATE] IN BLOOD BY WESTERGREN METHOD: 3 MM/H (ref 0–20)
GLUCOSE SERPL-MCNC: 87 MG/DL (ref 74–99)
HCT VFR BLD AUTO: 43.9 % (ref 36–46)
HGB BLD-MCNC: 14.8 G/DL (ref 12–16)
IMM GRANULOCYTES # BLD AUTO: 0.02 X10*3/UL (ref 0–0.7)
IMM GRANULOCYTES NFR BLD AUTO: 0.3 % (ref 0–0.9)
LYMPHOCYTES # BLD AUTO: 2.27 X10*3/UL (ref 1.2–4.8)
LYMPHOCYTES NFR BLD AUTO: 34.6 %
MCH RBC QN AUTO: 31.5 PG (ref 26–34)
MCHC RBC AUTO-ENTMCNC: 33.7 G/DL (ref 32–36)
MCV RBC AUTO: 93 FL (ref 80–100)
MONOCYTES # BLD AUTO: 0.32 X10*3/UL (ref 0.1–1)
MONOCYTES NFR BLD AUTO: 4.9 %
NEUTROPHILS # BLD AUTO: 3.82 X10*3/UL (ref 1.2–7.7)
NEUTROPHILS NFR BLD AUTO: 58 %
NRBC BLD-RTO: 0 /100 WBCS (ref 0–0)
PLATELET # BLD AUTO: 258 X10*3/UL (ref 150–450)
POTASSIUM SERPL-SCNC: 4.8 MMOL/L (ref 3.5–5.3)
PROT SERPL-MCNC: 7.6 G/DL (ref 6.4–8.2)
PROT UR-ACNC: 9 MG/DL (ref 5–24)
PROT/CREAT UR: 0.12 MG/MG CREAT (ref 0–0.17)
RBC # BLD AUTO: 4.7 X10*6/UL (ref 4–5.2)
SODIUM SERPL-SCNC: 139 MMOL/L (ref 136–145)
WBC # BLD AUTO: 6.6 X10*3/UL (ref 4.4–11.3)

## 2024-02-19 PROCEDURE — 80053 COMPREHEN METABOLIC PANEL: CPT

## 2024-02-19 PROCEDURE — 86160 COMPLEMENT ANTIGEN: CPT

## 2024-02-19 PROCEDURE — 86146 BETA-2 GLYCOPROTEIN ANTIBODY: CPT

## 2024-02-19 PROCEDURE — 82570 ASSAY OF URINE CREATININE: CPT

## 2024-02-19 PROCEDURE — 84156 ASSAY OF PROTEIN URINE: CPT

## 2024-02-19 PROCEDURE — 1036F TOBACCO NON-USER: CPT | Performed by: INTERNAL MEDICINE

## 2024-02-19 PROCEDURE — 85025 COMPLETE CBC W/AUTO DIFF WBC: CPT

## 2024-02-19 PROCEDURE — 36415 COLL VENOUS BLD VENIPUNCTURE: CPT

## 2024-02-19 PROCEDURE — 86147 CARDIOLIPIN ANTIBODY EA IG: CPT

## 2024-02-19 PROCEDURE — 85652 RBC SED RATE AUTOMATED: CPT

## 2024-02-19 PROCEDURE — 99215 OFFICE O/P EST HI 40 MIN: CPT | Performed by: INTERNAL MEDICINE

## 2024-02-19 PROCEDURE — 86225 DNA ANTIBODY NATIVE: CPT

## 2024-02-19 RX ORDER — HYDROXYCHLOROQUINE SULFATE 200 MG/1
200 TABLET, FILM COATED ORAL DAILY
Qty: 90 TABLET | Refills: 1 | Status: SHIPPED | OUTPATIENT
Start: 2024-02-19

## 2024-02-19 ASSESSMENT — PATIENT HEALTH QUESTIONNAIRE - PHQ9
1. LITTLE INTEREST OR PLEASURE IN DOING THINGS: NOT AT ALL
SUM OF ALL RESPONSES TO PHQ9 QUESTIONS 1 AND 2: 0
2. FEELING DOWN, DEPRESSED OR HOPELESS: NOT AT ALL

## 2024-02-19 NOTE — PATIENT INSTRUCTIONS
Resume hydroxychloroquine 200 mg daily.  Check labs: CMP, CBC with diff, ESR, dsDNA, C3, C4, spot urine protein to creatinine ratio.  Check anticardiolipin antibody, beta-2 glycoprotein antibody.  Referred for follow up eye exam.  Follow-up in 3 months.

## 2024-02-19 NOTE — PROGRESS NOTES
Subjective   Patient ID: Maryan Mao is a 29 y.o. female who presents for evaluation of SLE (referred from Dr. Fox).    HPI 29-year-old female referred from Dr. Fox for evaluation of SLE.    She was diagnosed with SLE at age 18.  She states that she was hospitalized at that time, spent the better part of 2 months in and out of the hospital.  Her symptoms included malar rash, urticaria, swollen and red joints, and myalgias.    She states that she had diffuse alopecia on 3 occasions.  She has chronically dry eyes and dry mouth.    She was on hydroxychloroquine from age 18 until 2018.  She was then off hydroxychloroquine for about 0939-1371.  She states that she had a major flare in 2020, consisting of arthralgias and myalgias.  She established care with Dr. Gimenez.  She resumed hydroxychloroquine, then was also started on Benlysta -she initially started IV, then was changed to subcutaneous injections.    She now notes that she has been off Benlysta for about 18 months, and she continues to feel well.  Dr. Gimenez had encouraged her to resume-she would like a second opinion regarding this.    She was apparently on methotrexate at one point, which made her feel sick.  She was on mycophenolate 1 point which she tolerated, but she then stopped it due to lost to follow-up.  She also was on Orencia 1 point.    She has a history of internal iliac vein thrombosis at age 17.  She was evaluated by pediatric hematology was found to be heterozygous for factor V Leiden mutation.  She was initially on Xarelto 10 mg daily.  She stopped the Xarelto when she lost her insurance for 2 years.  She has a history of May-Thurner syndrome.  She was seen by Dr. Terrell in hematology 11/23, who cleared her to remain off anticoagulation.  She did refer her to vascular surgery to see if she would benefit from stent placement.    She is currently on hydroxychloroquine 200 mg daily.  She currently feels well.  She denies joint pain, rashes,  chest pain, palpitations.  She has had 3 episodes of alopecia, but currently is not having hair loss.  She has mild chronic fatigue.  She gets episodic headaches.    Labs January 2019: KODAK 1:80 (homogeneous), double-stranded DNA 8 (5-9 equivocal, positive is greater than equal to 10), C3 and C4 normal, CMP normal, CBC normal, 25-hydroxy vitamin D 18  Labs December 2019: Double-stranded DNA 45, C3 and C4 normal, ESR 24, spot urine protein to creatinine ratio 0.26.  CRP 1.34  Labs August 2020: CBC normal, CMP normal, ESR 6, C3 and C4 normal, spot urine protein to creatinine ratio 0.38  Labs September 2023: CBC normal, CMP normal, C3 and C4 normal, spot urine protein to creatinine ratio 0.1, 25-hydroxy vitamin D 36    Medical problem list:  SLE- diagnosed at age 18  Internal iliac vein throbosis at age 17- heterozygous for Factor 5 leiden (also has May-Thurner syndrome)    Medications: Reviewed as documented  Surgeries:  Tonsillectomy  Thrombectomy  Skin biopsy from left forearm and left ear    Allergies: Reviewed as documented    Social history: Single.  Denies use of tobacco.  Drinks alcohol socially.  Currently employed as a  and .    Family history:   Mother-Graves' disease, pulmonary embolus  Maternal grandmother-rheumatoid arthritis, type 2 diabetes, end-stage renal failure  Paternal grandmother- RA    Constitutional: Denies fever, chills,  or weight loss.  Complains of mild chronic fatigue.  Gets ill with sun exposure-gets headache, nausea, vomiting and diarrhea.  Eyes: Denies red eyes, dry eyes, painful eyes, or blurred vision.  ENT: Denies sore throat or hoarseness.  Cardiovascular: Denies chest pain, palpitations, or ankle edema.  Respiratory: Denies cough or shortness of breath.  Gastrointestinal: Denies change in bowel habits, denies diarrhea, denies constipation, denies melena or bright red blood per rectum, denies abdominal pain.  Musculoskeletal: Denies joint pain , has 5 minutes of  "morning stiffness.   Skin: Denies rashes, nodules or other skin lesions.  Fingers and toes turn white with cold exposure.  Neurologic: Denies dizziness, weakness numbness or tingling.  Gets episodic headaches.  Psychiatric: Denies confusion or sleep disturbance.  Endocrine: Denies goiter or thyroid disorder.    Objective   /70 (BP Location: Left arm, Patient Position: Sitting, BP Cuff Size: Small adult)   Pulse 81   Temp 36.6 °C (97.8 °F)   Ht 1.562 m (5' 1.5\")   Wt 58.4 kg (128 lb 12.8 oz)   SpO2 99%   BMI 23.94 kg/m²     Physical Exam  General appearance: Well-nourished well-appearing.  HEENT: PERRL, EOMI  Neck: Supple, no nodes.  CV: RRR, no MGR.  Lungs: Clear, no rales or wheezes.  Abdomen: Soft, nontender. No hepatosplenomegaly.  Extremities:  No cyanosis, clubbing, or edema.  MS: No synovitis.  Skin: No rashes or nodules.      Assessment/Plan     SLE-diagnosed at age 18, was hospitalized with symptoms of joint pain, malar rash, hives, swollen and red joints.    She was on hydroxychloroquine for many years, then was off all treatment for about 2 years, then experienced a major flare in 2020.  Hydroxychloroquine was resumed, and Benlysta was also added.    (At some point she was on methotrexate which she did not tolerate.  She was on mycophenolate 1 point which she did tolerate, she was also on Orencia 1 point).    She has been off the Benlysta for 18 months, and she would like to avoid resuming it if she can because she still feels well.    She also has a history of left internal iliac vein thrombosis at age 18-also has May-Thurner syndrome.  She was evaluated by hematology, was found to be heterozygous for factor V Leiden mutation.  She took Xarelto for several years, but has been off for at least a few years.  Dr. Terrell evaluated her November 2023, and cleared her to remain off anticoagulation.  She did refer her to vascular surgery to see if she is a candidate for stent.    Plan:  Resume " hydroxychloroquine 200 mg daily.  Check labs: CMP, CBC with diff, ESR, dsDNA, C3, C4, spot urine protein to creatinine ratio.  Check anticardiolipin antibody, beta-2 glycoprotein antibody.  Referred for follow up eye exam.  Follow-up in 3 months.

## 2024-02-20 LAB
B2 GLYCOPROT1 IGA SER-ACNC: 0.6 U/ML
B2 GLYCOPROT1 IGG SER-ACNC: <1.4 U/ML
B2 GLYCOPROT1 IGM SER-ACNC: 0.2 U/ML
C3 SERPL-MCNC: 118 MG/DL (ref 87–200)
C4 SERPL-MCNC: 25 MG/DL (ref 10–50)
CARDIOLIPIN IGA SERPL-ACNC: 1 APL U/ML
CARDIOLIPIN IGG SER IA-ACNC: <1.6 GPL U/ML
CARDIOLIPIN IGM SER IA-ACNC: 0.2 MPL U/ML
DSDNA AB SER-ACNC: 1 IU/ML

## 2024-05-20 ENCOUNTER — OFFICE VISIT (OUTPATIENT)
Dept: RHEUMATOLOGY | Facility: CLINIC | Age: 29
End: 2024-05-20
Payer: COMMERCIAL

## 2024-05-20 VITALS
BODY MASS INDEX: 23.48 KG/M2 | OXYGEN SATURATION: 96 % | DIASTOLIC BLOOD PRESSURE: 72 MMHG | WEIGHT: 127.6 LBS | SYSTOLIC BLOOD PRESSURE: 106 MMHG | TEMPERATURE: 98 F | HEART RATE: 70 BPM | HEIGHT: 62 IN

## 2024-05-20 DIAGNOSIS — M32.9 SYSTEMIC LUPUS ERYTHEMATOSUS, UNSPECIFIED SLE TYPE, UNSPECIFIED ORGAN INVOLVEMENT STATUS (MULTI): ICD-10-CM

## 2024-05-20 DIAGNOSIS — M79.672 LEFT FOOT PAIN: Primary | ICD-10-CM

## 2024-05-20 DIAGNOSIS — F43.23 ADJUSTMENT REACTION WITH ANXIETY AND DEPRESSION: ICD-10-CM

## 2024-05-20 PROCEDURE — 3008F BODY MASS INDEX DOCD: CPT | Performed by: INTERNAL MEDICINE

## 2024-05-20 PROCEDURE — 99214 OFFICE O/P EST MOD 30 MIN: CPT | Performed by: INTERNAL MEDICINE

## 2024-05-20 RX ORDER — HYDROXYZINE HYDROCHLORIDE 25 MG/1
25 TABLET, FILM COATED ORAL 3 TIMES DAILY PRN
Qty: 30 TABLET | Refills: 1 | Status: SHIPPED | OUTPATIENT
Start: 2024-05-20

## 2024-05-20 NOTE — PROGRESS NOTES
Subjective   Patient ID: Maryan Mao is a 29 y.o. female who presents for follow up evaluation of SLE.    HPI 29-year-old female referred from Dr. Fox for evaluation of SLE.    She was diagnosed with SLE at age 18.  She states that she was hospitalized at that time, spent the better part of 2 months in and out of the hospital.  Her symptoms included malar rash, urticaria, swollen and red joints, and myalgias.    She states that she had diffuse alopecia on 3 occasions.  She has chronically dry eyes and dry mouth.    She was on hydroxychloroquine from age 18 until 2018.  She was then off hydroxychloroquine for about [id number].  She states that she had a major flare in 2020, consisting of arthralgias and myalgias.  She established care with Dr. Gimenez.  She resumed hydroxychloroquine, then was also started on Benlysta -she initially started IV, then was changed to subcutaneous injections.    She now notes that she has been off Benlysta for about 21 months, and she continues to feel well.  Dr. Gimenez had encouraged her to resume-she would like a second opinion regarding this.    She was apparently on methotrexate at one point, which made her feel sick.  She was on mycophenolate 1 point which she tolerated, but she then stopped it due to lost to follow-up.  She also was on Orencia 1 point.    She has a history of internal iliac vein thrombosis at age 17.  She was evaluated by pediatric hematology was found to be heterozygous for factor V Leiden mutation.  She was initially on Xarelto 10 mg daily.  She stopped the Xarelto when she lost her insurance for 2 years.  She has a history of May-Thurner syndrome.  She was seen by Dr. Terrell in hematology 11/23, who cleared her to remain off anticoagulation.  She did refer her to vascular surgery to see if she would benefit from stent placement.    She is currently on hydroxychloroquine 200 mg daily.  She currently feels well.  She denies joint pain, rashes, chest pain,  palpitations.  She has had 3 episodes of alopecia, but currently is not having hair loss.  She has mild chronic fatigue.  She gets episodic headaches.    She c/o scalp itching. She was using Head and Shoulders.  She is now using medicated Neutrogena Shampoo, which has helped.    She c/o pain in left second and third MTP joints, only with extreme flexion of her toes.  This symptom has been present for about 3 weeks.  She denies any trauma to the foot.  She has not noted any swelling.    She has mild dry eyes for which she uses over-the-counter artificial tears.    Labs January 2019: KODAK 1:80 (homogeneous), double-stranded DNA 8 (5-9 equivocal, positive is greater than equal to 10), C3 and C4 normal, CMP normal, CBC normal, 25-hydroxy vitamin D 18  Labs December 2019: Double-stranded DNA 45, C3 and C4 normal, ESR 24, spot urine protein to creatinine ratio 0.26.  CRP 1.34  Labs August 2020: CBC normal, CMP normal, ESR 6, C3 and C4 normal, spot urine protein to creatinine ratio 0.38  Labs September 2023: CBC normal, CMP normal, C3 and C4 normal, spot urine protein to creatinine ratio 0.1, 25-hydroxy vitamin D 36  Labs 2/24: CMP normal, CBC with diff normal, anticardiolipin antibody negative, beta-2 glycoprotein antibody negative, ESR 3, double-stranded DNA negative, C3 and C4 normal, spot urine protein to creatinine ratio 0.12    Medical problem list:  SLE- diagnosed at age 18  Internal iliac vein throbosis at age 17- heterozygous for Factor 5 leiden (also has May-Thurner syndrome)    Medications: Reviewed as documented  Surgeries:  Tonsillectomy  Thrombectomy  Skin biopsy from left forearm and left ear    Allergies: Reviewed as documented    Social history: Single.  Denies use of tobacco.  Drinks alcohol socially.  Currently employed as a  and .    Family history:   Mother-Graves' disease, pulmonary embolus  Maternal grandmother-rheumatoid arthritis, type 2 diabetes, end-stage renal failure  Paternal  "grandmother- RA    ROS:  General: Denies fevers or chills.  CV: Denies chest pain or palpitations.  Denies leg edema.  Lungs: Denies coughing or shortness of breath.  Skin: Denies rashes or nodules.  MS: Denies joint pain or joint swelling.     Objective   /72 (BP Location: Left arm, Patient Position: Sitting, BP Cuff Size: Small adult)   Pulse 70   Temp 36.7 °C (98 °F)   Ht 1.562 m (5' 1.5\")   Wt 57.9 kg (127 lb 9.6 oz)   SpO2 96%   BMI 23.72 kg/m²     Physical Exam  General appearance: Well-nourished well-appearing.  HEENT: PERRL, EOMI  Neck: Supple, no nodes.  CV: RRR, no MGR.  Lungs: Clear, no rales or wheezes.  Abdomen: Soft, nontender. No hepatosplenomegaly.  Extremities:  No cyanosis, clubbing, or edema.  MS: No synovitis.  No swelling in left foot.  Mild tenderness to palpation of left second and third MTP joints.  Skin: No rashes or nodules.      Assessment/Plan   Problem List Items Addressed This Visit             ICD-10-CM    Adjustment reaction with anxiety and depression F43.23    Relevant Medications    hydrOXYzine HCL (Atarax) 25 mg tablet    Systemic lupus erythematosus (Multi) (Chronic) M32.9    Relevant Orders    CBC and Auto Differential    Protein, Urine Random    Sedimentation Rate    Creatinine, Urine Random    Basic Metabolic Panel    BMI 23.0-23.9, adult Z68.23     Other Visit Diagnoses         Codes    Left foot pain    -  Primary M79.672    Relevant Orders    XR foot left 3+ views            SLE-diagnosed at age 18, was hospitalized with symptoms of joint pain, malar rash, hives, swollen and red joints.    She was on hydroxychloroquine for many years, then was off all treatment for about 2 years, then experienced a major flare in 2020.  Hydroxychloroquine was resumed, and Benlysta was also added.    (At some point she was on methotrexate which she did not tolerate.  She was on mycophenolate 1 point which she did tolerate, she was also on Orencia 1 point).    She has been off the " Benlysta for 18 months, and she would like to avoid resuming it if she can because she still feels well.    She also has a history of left internal iliac vein thrombosis at age 18-also has May-Thurner syndrome.  She was evaluated by hematology, was found to be heterozygous for factor V Leiden mutation.  She took Xarelto for several years, but has been off for at least a few years.  Dr. Terrell evaluated her November 2023, and cleared her to remain off anticoagulation.  She did refer her to vascular surgery to see if she is a candidate for stent.    BMI 23- stable.    Left foot pain-he has pain in left second and third MTP joints.  X-ray of left foot ordered.    Plan:  Continue hydroxychloroquine, which was resumed February 2024.  Referred for follow-up eye exam.  Check labs 6/24: CBC with diff, BMP,  ESR, spot urine protein to creatinine ratio.  Follow-up in 4 months.

## 2024-05-20 NOTE — PATIENT INSTRUCTIONS
Continue hydroxychloroquine, which was resumed February 2024.  Referred for follow-up eye exam.  Check labs 6/24: CBC with diff, BMP,  ESR, spot urine protein to creatinine ratio.  Follow-up in 4 months.

## 2024-05-22 PROBLEM — Z79.60 LONG-TERM USE OF IMMUNOSUPPRESSANT MEDICATION: Status: RESOLVED | Noted: 2023-09-27 | Resolved: 2024-05-22

## 2024-06-14 ENCOUNTER — APPOINTMENT (OUTPATIENT)
Dept: OPHTHALMOLOGY | Facility: CLINIC | Age: 29
End: 2024-06-14
Payer: COMMERCIAL

## 2024-06-28 ENCOUNTER — OFFICE VISIT (OUTPATIENT)
Dept: PRIMARY CARE | Facility: CLINIC | Age: 29
End: 2024-06-28
Payer: COMMERCIAL

## 2024-06-28 VITALS
BODY MASS INDEX: 23.24 KG/M2 | WEIGHT: 125 LBS | OXYGEN SATURATION: 97 % | TEMPERATURE: 98 F | SYSTOLIC BLOOD PRESSURE: 108 MMHG | DIASTOLIC BLOOD PRESSURE: 70 MMHG | HEART RATE: 92 BPM

## 2024-06-28 DIAGNOSIS — R11.0 NAUSEA: ICD-10-CM

## 2024-06-28 DIAGNOSIS — R19.7 DIARRHEA, UNSPECIFIED TYPE: ICD-10-CM

## 2024-06-28 DIAGNOSIS — J01.10 ACUTE NON-RECURRENT FRONTAL SINUSITIS: Primary | ICD-10-CM

## 2024-06-28 PROCEDURE — 3008F BODY MASS INDEX DOCD: CPT | Performed by: INTERNAL MEDICINE

## 2024-06-28 PROCEDURE — 99213 OFFICE O/P EST LOW 20 MIN: CPT | Performed by: INTERNAL MEDICINE

## 2024-06-28 RX ORDER — AMOXICILLIN AND CLAVULANATE POTASSIUM 875; 125 MG/1; MG/1
875 TABLET, FILM COATED ORAL 2 TIMES DAILY
Qty: 20 TABLET | Refills: 0 | Status: SHIPPED | OUTPATIENT
Start: 2024-06-28 | End: 2024-07-08

## 2024-06-28 RX ORDER — ONDANSETRON 4 MG/1
4 TABLET, FILM COATED ORAL EVERY 8 HOURS PRN
Qty: 30 TABLET | Refills: 3 | Status: SHIPPED | OUTPATIENT
Start: 2024-06-28 | End: 2024-08-07

## 2024-06-28 RX ORDER — FLUTICASONE PROPIONATE 50 MCG
SPRAY, SUSPENSION (ML) NASAL
Qty: 16 G | Refills: 3 | Status: SHIPPED | OUTPATIENT
Start: 2024-06-28

## 2024-06-28 NOTE — PROGRESS NOTES
Subjective   Patient ID: Maryan Mao is a 29 y.o. female who presents for URI (On and off x 2 weeks. Cold chills, scratchy throat, SOB,chest tightness, green phlegm, diarrhea. COVID TEST WAS NEGATIVE YESTERDAY).    Here this Friday afternoon with a constellation of symptoms    She notes initially about 2 weeks ago she had the onset of fevers aches chills and diarrhea.  She has had some abdominal pain but no rectal bleeding.  She is also had bouts of nausea with occasional emesis.  Yesterday she had 2 bouts of emesis after eating.  Today she was able to drink some water but then later had diarrhea.  She has not had any nighttime diarrhea.  She did travel for vacation earlier last month but not since.  Last menstrual period 3 and half weeks ago    Yesterday she developed worsening sore throat congestion sinus pressure and ear popping.  No significant cough.  COVID-negative test at home         Review of Systems    Objective   /70   Pulse 92   Temp 36.7 °C (98 °F)   Wt 56.7 kg (125 lb)   SpO2 97%   BMI 23.24 kg/m²     Physical Exam  Constitutional:       Appearance: Normal appearance.   HENT:      Head: Normocephalic and atraumatic.      Comments: Bilateral serous fluid both TMs without erythema     Nose: Nose normal.   Eyes:      General: No scleral icterus.     Extraocular Movements: Extraocular movements intact.      Conjunctiva/sclera: Conjunctivae normal.      Pupils: Pupils are equal, round, and reactive to light.   Cardiovascular:      Rate and Rhythm: Normal rate and regular rhythm.      Pulses: Normal pulses.      Heart sounds: Normal heart sounds. No murmur heard.  Pulmonary:      Effort: Pulmonary effort is normal. No respiratory distress.      Breath sounds: Normal breath sounds. No stridor. No wheezing.   Abdominal:      General: Abdomen is flat. Bowel sounds are normal. There is no distension.      Palpations: Abdomen is soft. There is no mass.      Tenderness: There is no abdominal  tenderness. There is no guarding.   Musculoskeletal:         General: No swelling, tenderness or deformity. Normal range of motion.      Cervical back: Normal range of motion and neck supple. No tenderness.   Lymphadenopathy:      Cervical: No cervical adenopathy.   Skin:     General: Skin is warm and dry.      Findings: No lesion or rash.   Neurological:      General: No focal deficit present.      Mental Status: She is alert and oriented to person, place, and time.      Cranial Nerves: No cranial nerve deficit.      Motor: No weakness.   Psychiatric:         Mood and Affect: Mood normal.         Behavior: Behavior normal.         Thought Content: Thought content normal.         Judgment: Judgment normal.         Assessment/Plan   Problem List Items Addressed This Visit    None  Visit Diagnoses         Codes    Acute non-recurrent frontal sinusitis    -  Primary J01.10    Relevant Medications    amoxicillin-pot clavulanate (Augmentin) 875-125 mg tablet    fluticasone (Flonase) 50 mcg/actuation nasal spray    Nausea     R11.0    Relevant Medications    ondansetron (Zofran) 4 mg tablet             Recent diarrhea for 2 weeks  Intermittent nausea and emesis  Postnasal drip and congestion worsening over the last several days  Frontal sinus pressure  Eustachian tube dysfunction    Her constellation of symptoms is concerning this Friday afternoon before the weekend.  She may have had a foodborne illness initially.  Significant dehydration a concern presently.  She understands if symptoms worsen, including fevers nausea vomiting abdominal pain or emesis, she will need to go to the emergency room.  Wrote out a list of suggestions to optimize compliance for her-  She will use Zofran initially and as needed for nausea  She will push fluids-small amounts of initially and then advance as she understands she is significantly dehydrated-till urine clears  For suspected sinusitis-she will use Augmentin with a probiotic  For  postnasal drip she will use the Flonase twice daily for 10 to 14 days until congestion improves  She will try to sleep upright  For diarrhea she will remain on a strict brat diet, including no cheese no eggs no milk no dairy no meats for 48 to 72 hours.  If watery diarrhea persists after that point, she will do stool studies  She will follow-up next week if not improving otherwise as scheduled    Charting was completed using voice recognition technology and may include unintended errors.

## 2024-07-01 ENCOUNTER — LAB (OUTPATIENT)
Dept: LAB | Facility: LAB | Age: 29
End: 2024-07-01
Payer: COMMERCIAL

## 2024-07-01 DIAGNOSIS — R19.7 DIARRHEA, UNSPECIFIED TYPE: ICD-10-CM

## 2024-07-01 PROCEDURE — 87506 IADNA-DNA/RNA PROBE TQ 6-11: CPT

## 2024-07-01 PROCEDURE — 87493 C DIFF AMPLIFIED PROBE: CPT

## 2024-07-02 LAB
C COLI+JEJ+UPSA DNA STL QL NAA+PROBE: NOT DETECTED
C DIF TOX TCDA+TCDB STL QL NAA+PROBE: NOT DETECTED
EC STX1 GENE STL QL NAA+PROBE: NOT DETECTED
EC STX2 GENE STL QL NAA+PROBE: NOT DETECTED
NOROVIRUS GI + GII RNA STL NAA+PROBE: NOT DETECTED
RV RNA STL NAA+PROBE: NOT DETECTED
SALMONELLA DNA STL QL NAA+PROBE: NOT DETECTED
SHIGELLA DNA SPEC QL NAA+PROBE: NOT DETECTED
V CHOLERAE DNA STL QL NAA+PROBE: NOT DETECTED
Y ENTEROCOL DNA STL QL NAA+PROBE: NOT DETECTED

## 2024-07-03 NOTE — RESULT ENCOUNTER NOTE
Maryan -thank you for doing the stool studies.  I am glad that the results showed no evidence of any abnormal bacteria or C. difficile colitis.  Please continue the probiotic, and diet changes as we did suggested and follow-up if the intestinal symptoms persist.    Sincerely,  Eleuterio Schneider MD - covering for Dr. Fox

## 2024-09-23 ENCOUNTER — APPOINTMENT (OUTPATIENT)
Dept: RHEUMATOLOGY | Facility: CLINIC | Age: 29
End: 2024-09-23
Payer: COMMERCIAL

## 2025-01-02 ENCOUNTER — APPOINTMENT (OUTPATIENT)
Dept: PRIMARY CARE | Facility: CLINIC | Age: 30
End: 2025-01-02
Payer: COMMERCIAL